# Patient Record
Sex: MALE | Race: BLACK OR AFRICAN AMERICAN | NOT HISPANIC OR LATINO | URBAN - METROPOLITAN AREA
[De-identification: names, ages, dates, MRNs, and addresses within clinical notes are randomized per-mention and may not be internally consistent; named-entity substitution may affect disease eponyms.]

---

## 2021-05-28 ENCOUNTER — OFFICE VISIT - RIVER FALLS (OUTPATIENT)
Dept: FAMILY MEDICINE | Facility: CLINIC | Age: 36
End: 2021-05-28

## 2021-05-28 ASSESSMENT — MIFFLIN-ST. JEOR: SCORE: 2138.08

## 2022-02-11 VITALS
HEIGHT: 67 IN | BODY MASS INDEX: 43.16 KG/M2 | TEMPERATURE: 97 F | WEIGHT: 275 LBS | DIASTOLIC BLOOD PRESSURE: 85 MMHG | SYSTOLIC BLOOD PRESSURE: 129 MMHG | HEART RATE: 70 BPM

## 2022-02-16 NOTE — PROGRESS NOTES
Patient:   ONEIL VAUGHAN            MRN: [id number]            FIN: [id number]               Age:   35 years     Sex:  Male     :  1985   Associated Diagnoses:   Bad headache   Author:   Harry Bustillo MD      Visit Information      Date of Service: 2021 02:37 pm  Performing Location: [facility]  Encounter#: [id number]      Primary Care Provider (PCP):  NONE ,       Referring Provider:  Harry Bustillo MD    NPI# [id number]      Chief Complaint   2021 2:38 PM CDT    Concerns with HA on/off x 1 week. No vision changes or nausea. Excedrin this am has not helped.        History of Present Illness   Patient is here with headaches.  Over the last week he has had some headaches.  They re occipital sometimes right-sided no associated nausea vomiting no blurry vision double vision no fever chills or sweats no neck pain or stiffness.  Last weekend and then again at about 4 this morning when he was at work.  He takes Excedrin gives him relief.  Does not stop him from doing things.  No some decreased appetite over the last week with no other symptoms.  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  He finds this very stressful and seems to correlate with his headaches.  No history of headaches in the past no family history of headache issues no tick bites no trauma         Review of Systems   Constitutional:  Negative except as documented in history of present illness.    Eye:  Negative.    Ear/Nose/Mouth/Throat:  Negative.    Respiratory:  Negative.    Cardiovascular:  Negative.    Gastrointestinal:  Negative.    Genitourinary:  Negative.    Hematology/Lymphatics:  Negative.    Endocrine:  Negative.    Immunologic:  Negative.    Musculoskeletal:  Negative.    Integumentary:  Negative.    Neurologic:  Negative except as documented in  history of present illness.    Psychiatric:  Negative except as documented in history of present illness.       Health Status   Allergies:    Allergic Reactions (Selected)  No Known Medication Allergies   Problem list:    All Problems  Morbid obesity / SNOMED CT 992503602 / Probable  Tobacco user / SNOMED CT 451306494 / Probable      Histories   Past Medical History:    No active or resolved past medical history items have been selected or recorded.   Family History:    No family history items have been selected or recorded.   Procedure history:    No active procedure history items have been selected or recorded.   Social History:        Electronic Cigarette/Vaping Assessment            Electronic Cigarette Use: Never.      Tobacco Assessment            4 or less cigarettes(less than 1/4 pack)/day in last 30 days        Physical Examination   Measurements from flowsheet : Measurements   5/28/2021 2:38 PM CDT Height Measured - Standard 66.5 in    Weight Measured - Standard 275 lb    BSA 2.42 m2    Body Mass Index 43.72 kg/m2  HI      General:  Alert and oriented, No acute distress.    Eye:  Pupils are equal, round and reactive to light, Extraocular movements are intact, Normal conjunctiva, Vision unchanged.    HENT:  Tympanic membranes are clear.    Neck:  Supple, Non-tender.    Neurologic:  Alert, Oriented, No focal deficits, Cranial Nerves II-XII are grossly intact.    Psychiatric:  Cooperative, Appropriate mood & affect.       Impression and Plan   Diagnosis     Bad headache ([id number]-YY R51.9).     Plan:  Patient with new onset headaches that seem benign and related to stress at this point.  He certainly seems to be tolerating them very well.  He is going to keep a headache diary over the next month try to handle the stress at work.  He works all night which was challenging.  If his headaches are persistent after a month we'll see his back or follow-up if they worsen in any way he'll awoke sooner  .

## 2022-02-16 NOTE — NURSING NOTE
Comprehensive Intake Entered On:  2021 2:46 PM CDT    Performed On:  2021 2:38 PM CDT by Sherrell Ma CMA               Summary   Chief Complaint :   Concerns with HA on/off x 1 week. No vision changes or nausea. Excedrin this am has not helped.   Weight Measured :   275 lb(Converted to: 275 lb 0 oz, 124.738 kg)    Height Measured :   66.5 in(Converted to: 5 ft 6 in, 168.91 cm)    Body Mass Index :   43.72 kg/m2 (HI)    Body Surface Area :   2.42 m2   Systolic Blood Pressure :   129 mmHg   Diastolic Blood Pressure :   85 mmHg (HI)    Mean Arterial Pressure :   100 mmHg   Peripheral Pulse Rate :   70 bpm   BP Site :   Right arm   Pulse Site :   Radial artery   BP Method :   Electronic   HR Method :   Electronic   Temperature Tympanic :   97.0 DegF(Converted to: 36.1 DegC)  (LOW)    Sherrell Ma CMA - 2021 2:38 PM CDT   Health Status   Allergies Verified? :   Yes   Medication History Verified? :   Yes   Pre-Visit Planning Status :   Not completed   Tobacco Use? :   Current some day smoker   Sherrell Ma CMA - 2021 2:38 PM CDT   Demographics   Last Name :   Didi   First Name :   Lynn   Responsible Party Date of Birth () :   1985 CDT   Contact Relationship to Patient (other) :   Patient   [place] Sherrell MCKNIGHT - 2021 2:38 PM CDT   Consents   Consent for Immunization Exchange :   Consent Granted   Consent for Immunizations to Providers :   Consent Granted   Sherrell Ma CMA - 2021 2:38 PM CDT   Meds / Allergies   (As Of: 2021 2:47:00 PM CDT)   Allergies (Active)   No Known Medication Allergies  Estimated Onset Date:   Unspecified ; Created By:   Sherrell Ma CMA; Reaction Status:   Active ; Category:   Drug ; Substance:   No Known Medication Allergies ; Type:   Allergy ; Updated By:   Sherrell Ma CMA; Reviewed Date:   2021 2:46 PM CDT        Medication List   (As Of: 2021 2:47:00 PM CDT)   No Known Home Medications     Sherrell Ma CMA -  5/28/2021 2:46:58 PM           ID Risk Screen   Recent Travel History :   No recent travel   Family Member Travel History :   No recent travel   Other Exposure to Infectious Disease :   Unknown   COVID-19 Testing Status :   No COVID-19 test performed   Sherrell Ma CMA - 5/28/2021 2:38 PM CDT   Social History   Social History   (As Of: 5/28/2021 2:46:04 PM CDT)   Tobacco:        4 or less cigarettes(less than 1/4 pack)/day in last 30 days   (Last Updated: 5/28/2021 2:44:19 PM CDT by Sherrell Ma CMA)          Electronic Cigarette/Vaping:        Electronic Cigarette Use: Never.   (Last Updated: 5/28/2021 2:44:25 PM CDT by Sherrell Ma CMA)

## 2025-05-27 ENCOUNTER — OFFICE VISIT (OUTPATIENT)
Dept: FAMILY MEDICINE | Facility: CLINIC | Age: 40
End: 2025-05-27
Payer: COMMERCIAL

## 2025-05-27 ENCOUNTER — RESULTS FOLLOW-UP (OUTPATIENT)
Dept: FAMILY MEDICINE | Facility: CLINIC | Age: 40
End: 2025-05-27

## 2025-05-27 VITALS
SYSTOLIC BLOOD PRESSURE: 112 MMHG | OXYGEN SATURATION: 100 % | RESPIRATION RATE: 18 BRPM | HEIGHT: 66 IN | TEMPERATURE: 97.1 F | WEIGHT: 234.7 LBS | HEART RATE: 86 BPM | DIASTOLIC BLOOD PRESSURE: 70 MMHG | BODY MASS INDEX: 37.72 KG/M2

## 2025-05-27 DIAGNOSIS — Z13.1 SCREENING FOR DIABETES MELLITUS: ICD-10-CM

## 2025-05-27 DIAGNOSIS — R68.84 JAW PAIN: Primary | ICD-10-CM

## 2025-05-27 DIAGNOSIS — Z80.42 FAMILY HISTORY OF PROSTATE CANCER IN FATHER: ICD-10-CM

## 2025-05-27 DIAGNOSIS — Z13.6 SCREENING FOR CARDIOVASCULAR CONDITION: ICD-10-CM

## 2025-05-27 LAB
EST. AVERAGE GLUCOSE BLD GHB EST-MCNC: 117 MG/DL
HBA1C MFR BLD: 5.7 % (ref 0–5.6)

## 2025-05-27 PROCEDURE — 90471 IMMUNIZATION ADMIN: CPT | Performed by: STUDENT IN AN ORGANIZED HEALTH CARE EDUCATION/TRAINING PROGRAM

## 2025-05-27 PROCEDURE — 90715 TDAP VACCINE 7 YRS/> IM: CPT | Performed by: STUDENT IN AN ORGANIZED HEALTH CARE EDUCATION/TRAINING PROGRAM

## 2025-05-27 PROCEDURE — 3078F DIAST BP <80 MM HG: CPT | Performed by: STUDENT IN AN ORGANIZED HEALTH CARE EDUCATION/TRAINING PROGRAM

## 2025-05-27 PROCEDURE — 36415 COLL VENOUS BLD VENIPUNCTURE: CPT | Performed by: STUDENT IN AN ORGANIZED HEALTH CARE EDUCATION/TRAINING PROGRAM

## 2025-05-27 PROCEDURE — 83036 HEMOGLOBIN GLYCOSYLATED A1C: CPT | Performed by: STUDENT IN AN ORGANIZED HEALTH CARE EDUCATION/TRAINING PROGRAM

## 2025-05-27 PROCEDURE — 3074F SYST BP LT 130 MM HG: CPT | Performed by: STUDENT IN AN ORGANIZED HEALTH CARE EDUCATION/TRAINING PROGRAM

## 2025-05-27 PROCEDURE — 99203 OFFICE O/P NEW LOW 30 MIN: CPT | Mod: 25 | Performed by: STUDENT IN AN ORGANIZED HEALTH CARE EDUCATION/TRAINING PROGRAM

## 2025-05-27 PROCEDURE — 80061 LIPID PANEL: CPT | Performed by: STUDENT IN AN ORGANIZED HEALTH CARE EDUCATION/TRAINING PROGRAM

## 2025-05-27 PROCEDURE — 3044F HG A1C LEVEL LT 7.0%: CPT | Performed by: STUDENT IN AN ORGANIZED HEALTH CARE EDUCATION/TRAINING PROGRAM

## 2025-05-27 NOTE — PATIENT INSTRUCTIONS
For your Jaw pain, recommend the following:   - Physical therapy - there are specialists through [facility] in [place] that can address this. We will fax referral. Call [phone] for scheduling  - If no improvement after 3-4 weeks of consistent physical therapy, recommend calling the [facility] - - [email or web address]  [place]: [phone]  --- for an assessment. If they require a referral, let us know and we can send it.     Your A1c, a marker of average blood sugar over the last 3 months, was slightly elevated at 5.7.  It is still well below the diabetes number, which is 6.5.  I recommend this be checked yearly.  Exercise after meals, working to decrease added sugars, and adding fiber to your diet can all help regulate your blood sugar.

## 2025-05-27 NOTE — PROGRESS NOTES
"  Assessment & Plan     Jaw pain  2 months of left-sided mechanical jaw pain, no concern for parotitis, trigeminal neuralgia, other neurologic issue, or dental pain.  Recommend TMD PT, and if no improvement, going to head neck pain clinic.  Information provided in AVS.  - Physical Therapy  Referral; Future    Screening for diabetes mellitus  - Hemoglobin A1c; Future  - Hemoglobin A1c  A1c came back slightly elevated at 5.7.  Discussed prediabetes is a risk factor for diabetes, ways to mitigate elevated blood sugars including diet changes and exercise.    Screening for cardiovascular condition  - Lipid panel reflex to direct LDL Fasting; Future  - Lipid panel reflex to direct LDL Fasting    Family history of prostate cancer in father  Recommend starting PSA screening at age 40.          Subjective   Lynn is a 39 year old, presenting for the following health issues:  Establish Care (States diabetes and prostate cancer run in his family)        2025     1:11 PM   Additional Questions   Roomed by Li   Accompanied by Luci-Girlfriend     History of Present Illness       Reason for visit:  Start primary care       Issue left side of jaw  Jaw will pop  Comes and goes  Jaw will lock up at times  Hurts to talk   Possible hx of jaw dislocation, but not sure  Bad for 2 months  Not spots in mouth  No pain uppper in temple or eye area  Will feel like his jaw is going to break his jaw  No current pain  Pain goes away on its own, though will wiggle his jaw and move things    Dad  of prostate ca - unclear what age had it  No LUTS    [employer] at a factory  Gets physical through work          Objective    /70   Pulse 86   Temp 97.1  F (36.2  C) (Tympanic)   Resp 18   Ht 1.676 m (5' 6\")   Wt 106.5 kg (234 lb 11.2 oz)   SpO2 100%   BMI 37.88 kg/m    Body mass index is 37.88 kg/m .  Physical Exam  Constitutional:       General: He is not in acute distress.     Appearance: Normal appearance. He " is not ill-appearing.   HENT:      Head:      Jaw: Tenderness (Some soreness over left mandible) and pain on movement present. No trismus or swelling.      Right Ear: Tympanic membrane, ear canal and external ear normal.      Nose: Nose normal.      Mouth/Throat:      Mouth: Mucous membranes are moist.      Pharynx: Oropharynx is clear.   Eyes:      Conjunctiva/sclera: Conjunctivae normal.   Cardiovascular:      Rate and Rhythm: Normal rate.   Pulmonary:      Effort: Pulmonary effort is normal.   Lymphadenopathy:      Cervical: No cervical adenopathy.   Skin:     General: Skin is warm.   Neurological:      General: No focal deficit present.      Mental Status: He is alert.   Psychiatric:         Mood and Affect: Mood normal.         Behavior: Behavior normal.          Results for orders placed or performed in visit on 05/27/25 (from the past 24 hours)   Hemoglobin A1c   Result Value Ref Range    Estimated Average Glucose 117 (H) <117 mg/dL    Hemoglobin A1C 5.7 (H) 0.0 - 5.6 %           Signed Electronically by: Danielle Benitez MD

## 2025-05-28 LAB
CHOLEST SERPL-MCNC: 218 MG/DL
FASTING STATUS PATIENT QL REPORTED: ABNORMAL
HDLC SERPL-MCNC: 55 MG/DL
LDLC SERPL CALC-MCNC: 151 MG/DL
NONHDLC SERPL-MCNC: 163 MG/DL
TRIGL SERPL-MCNC: 58 MG/DL